# Patient Record
Sex: FEMALE | Race: BLACK OR AFRICAN AMERICAN | NOT HISPANIC OR LATINO | Employment: FULL TIME | URBAN - METROPOLITAN AREA
[De-identification: names, ages, dates, MRNs, and addresses within clinical notes are randomized per-mention and may not be internally consistent; named-entity substitution may affect disease eponyms.]

---

## 2017-01-15 ENCOUNTER — HOSPITAL ENCOUNTER (EMERGENCY)
Facility: HOSPITAL | Age: 27
Discharge: HOME | End: 2017-01-15
Attending: FAMILY MEDICINE
Payer: MEDICAID

## 2017-01-15 VITALS
HEART RATE: 90 BPM | RESPIRATION RATE: 16 BRPM | SYSTOLIC BLOOD PRESSURE: 133 MMHG | WEIGHT: 210 LBS | TEMPERATURE: 99 F | OXYGEN SATURATION: 100 % | DIASTOLIC BLOOD PRESSURE: 63 MMHG | BODY MASS INDEX: 34.95 KG/M2

## 2017-01-15 DIAGNOSIS — J02.9 SORE THROAT: Primary | ICD-10-CM

## 2017-01-15 DIAGNOSIS — J06.9 URI, ACUTE: ICD-10-CM

## 2017-01-15 LAB
B-HCG UR QL: NEGATIVE
CTP QC/QA: YES
DEPRECATED S PYO AG THROAT QL EIA: NEGATIVE
FLUAV AG SPEC QL IA: NEGATIVE
FLUBV AG SPEC QL IA: NEGATIVE
SPECIMEN SOURCE: NORMAL

## 2017-01-15 PROCEDURE — 25000003 PHARM REV CODE 250: Performed by: PHYSICIAN ASSISTANT

## 2017-01-15 PROCEDURE — 87400 INFLUENZA A/B EACH AG IA: CPT

## 2017-01-15 PROCEDURE — 99283 EMERGENCY DEPT VISIT LOW MDM: CPT

## 2017-01-15 PROCEDURE — 87081 CULTURE SCREEN ONLY: CPT

## 2017-01-15 PROCEDURE — 87880 STREP A ASSAY W/OPTIC: CPT

## 2017-01-15 PROCEDURE — 81025 URINE PREGNANCY TEST: CPT | Performed by: PHYSICIAN ASSISTANT

## 2017-01-15 PROCEDURE — 99284 EMERGENCY DEPT VISIT MOD MDM: CPT | Mod: ,,, | Performed by: PHYSICIAN ASSISTANT

## 2017-01-15 RX ORDER — ACETAMINOPHEN 325 MG/1
650 TABLET ORAL
Status: COMPLETED | OUTPATIENT
Start: 2017-01-15 | End: 2017-01-15

## 2017-01-15 RX ORDER — BENZONATATE 100 MG/1
100 CAPSULE ORAL 3 TIMES DAILY PRN
Qty: 15 CAPSULE | Refills: 0 | Status: SHIPPED | OUTPATIENT
Start: 2017-01-15 | End: 2017-01-25

## 2017-01-15 RX ADMIN — ACETAMINOPHEN 650 MG: 325 TABLET ORAL at 05:01

## 2017-01-15 NOTE — ED AVS SNAPSHOT
[facility]  [address]  [place] [address]               Leydi Ferrell   1/15/2017  5:13 PM   ED    Description:  Female : 1990   Department[facility]  [facility]           Your Care was Coordinated By:     Provider Role From To    Poncho Davies MD Attending Provider 01/15/17 1716 --    Lisa Mane PA-C Physician Assistant 01/15/17 1716 --      Reason for Visit     Sore Throat     Otalgia           Diagnoses this Visit        Comments    Sore throat    -  Primary     URI, acute           ED Disposition     ED Disposition Condition Comment    Discharge             To Do List           Follow-up Information     Schedule an appointment as soon as possible for a visit with DAUGHTERS DAVID RICE.    Contact information:    [address] [id number] [phone]         These Medications        Disp Refills Start End    benzonatate (TESSALON) 100 MG capsule 15 capsule 0 1/15/2017 2017    Take 1 capsule (100 mg total) by mouth 3 (three) times daily as needed. - Oral      [facility]     [facility] -  Assistance  Registered nurses in the [facility] provide clinical advisement, health education, appointment booking, and other advisory services.  Call for this free service at [phone].             Medications           Message regarding Medications     Verify the changes and/or additions to your medication regime listed below are the same as discussed with your clinician today.  If any of these changes or additions are incorrect, please notify your healthcare provider.        START taking these NEW medications        Refills    benzonatate (TESSALON) 100 MG capsule 0    Sig: Take 1 capsule (100 mg total) by mouth 3 (three) times daily as needed.    Class: Print    Route: Oral      These medications were administered today        Dose Freq    acetaminophen tablet 650 mg  650 mg ED 1 Time    Sig: Take 2 tablets (650 mg total) by mouth ED 1 Time.    Class: Normal    Route: Oral    Cosign for Ordering: Accepted by Poncho Davies MD on 1/15/2017  6:22 PM      STOP taking these medications     acyclovir (ZOVIRAX) 800 MG Tab Take 1 tablet (800 mg total) by mouth 5 (five) times daily.    docusate sodium (COLACE) 100 MG capsule Take 1 capsule (100 mg total) by mouth 2 (two) times daily.    hydrocortisone 1 % cream Apply to affected area 2 times daily    oxycodone-acetaminophen (PERCOCET) 5-325 mg per tablet Take 1 tablet by mouth every 4 (four) hours as needed for Pain.           Verify that the below list of medications is an accurate representation of the medications you are currently taking.  If none reported, the list may be blank. If incorrect, please contact your healthcare provider. Carry this list with you in case of emergency.           Current Medications     benzonatate (TESSALON) 100 MG capsule Take 1 capsule (100 mg total) by mouth 3 (three) times daily as needed.           Clinical Reference Information           Your Vitals Were     BP Pulse Temp Resp Weight SpO2    124/66 (BP Location: Right arm, Patient Position: Sitting) 90 98.7 °F (37.1 °C) (Oral) 16 95.3 kg (210 lb) 98%    BMI                34.95 kg/m2          Allergies as of 1/15/2017     No Known Allergies      Immunizations Administered on Date of Encounter - 1/15/2017     None      ED Micro, Lab, POCT     Start Ordered       Status Ordering Provider    01/15/17 1749 01/15/17 1748  Influenza antigen  STAT      Final result     01/15/17 1749 01/15/17 1748  Rapid strep screen  STAT      Final result     01/15/17 1748 01/15/17 1748  Strep A culture, throat  Once      In process     01/15/17 1717 01/15/17 1716  POCT urine pregnancy  Once      Final result       ED Imaging Orders     None        Discharge Instructions       Follow up with your PCP. Take the cough medication (tessalon) three times a day as needed. Use  OTC flonase and mucinex. Use OTC theraflu. Take OTC tylenol and/or advil as needed for pain.  Return to the ED for any new or worsening symptoms, including those listed below.        Self-Care for Sore Throats  Sore throats occur for many reasons, such as colds, allergies, and infections caused by viruses or bacteria. In any case, your throat becomes red and sore. Your goal for self-care is to reduce your discomfort while giving your throat a chance to heal.    Moisten and Soothe Your Throat  · Try a sip of water first thing after waking up.  · Keep your throat moist by drinking 6 or more glasses of clear liquids every day.  · Run a cool-air humidifier in your room overnight.  · Avoid cigarette smoke.   · Suck on throat lozenges, cough drops, hard candy, ice chips, or frozen fruit-juice bars. Use the sugar-free versions if your diet or medical condition require them.  Gargle to Ease Irritation  Gargling every hour or 2 can ease irritation. Try gargling with 1 of these solutions:  · 1/4 teaspoon of salt in 1/2 cup of warm water  · An over-the-counter anesthetic gargle  Use Medication for More Relief  Over-the-counter medication can reduce sore throat symptoms. Ask your pharmacist if you have questions about which medication to use:  · Ease pain with anesthetic sprays. Aspirin or an aspirin substitute also helps. Remember, never give aspirin to anyone 18 or younger, or if you are already taking blood thinners.   · For sore throats caused by allergies, try antihistamines to block the allergic reaction.  · Remember: unless a sore throat is caused by a bacterial infection, antibiotics wont help you.  Prevent Future Sore Throats  · Stop smoking or reduce contact with secondhand smoke. Smoke irritates the tender throat lining.  · Limit contact with pets and with allergy-causing substances, such as pollen and mold.  · When youre around someone with a sore throat or cold, wash your hands frequently to keep viruses or  bacteria from spreading.  · Dont strain your vocal cords.  Call Your Health Care Provider  Contact your doctor if you have:  · A temperature over 101°F (38.3°C)  · White spots on the throat  · Great difficulty swallowing  · Trouble breathing  · A skin rash  · Recent exposure to someone else with strep bacteria  · Severe hoarseness and swollen glands in the neck or jaw   © 7674-0036 [employer]. [address] [place] [address]. All rights reserved. This information is not intended as a substitute for professional medical care. Always follow your healthcare professional's instructions.        Viral Upper Respiratory Illness (Adult)  You have a viral upper respiratory illness (URI), which is another term for the common cold. This illness is contagious during the first few days. It is spread through the air by coughing and sneezing. It may also be spread by direct contact (touching the sick person and then touching your own eyes, nose, or mouth). Frequent handwashing will decrease risk of spread. Most viral illnesses go away within 7 to 10 days with rest and simple home remedies. Sometimes the illness may last for several weeks. Antibiotics will not kill a virus, and they are generally not prescribed for this condition.    Home care  · If symptoms are severe, rest at home for the first 2 to 3 days. When you resume activity, don't let yourself get too tired.  · Avoid being exposed to cigarette smoke (yours or others).  · You may use acetaminophen or ibuprofen to control pain and fever, unless another medicine was prescribed. (Note: If you have chronic liver or kidney disease, have ever had a stomach ulcer or gastrointestinal bleeding, or are taking blood-thinning medicines, talk with your healthcare provider before using these medicines.) Aspirin should never be given to anyone under 18 years of age who is ill with a viral infection or fever. It may cause severe liver or brain damage.  · Your  appetite may be poor, so a light diet is fine. Avoid dehydration by drinking 6 to 8 glasses of fluids per day (water, soft drinks, juices, tea, or soup). Extra fluids will help loosen secretions in the nose and lungs.  · Over-the-counter cold medicines will not shorten the length of time youre sick, but they may be helpful for the following symptoms: cough, sore throat, and nasal and sinus congestion. (Note: Do not use decongestants if you have high blood pressure.)  Follow-up care  Follow up with your healthcare provider, or as advised.  When to seek medical advice  Call your healthcare provider right away if any of these occur:  · Cough with lots of colored sputum (mucus)  · Severe headache; face, neck, or ear pain  · Difficulty swallowing due to throat pain  · Fever of 100.4°F (38°C)  Call 911, or get immediate medical care  Call emergency services right away if any of these occur:  · Chest pain, shortness of breath, wheezing, or difficulty breathing  · Coughing up blood  · Inability to swallow due to throat pain  © 7863-5062 [employer]. [address] [place] [address]. All rights reserved. This information is not intended as a substitute for professional medical care. Always follow your healthcare professional's instructions.            [facility] Sign-Up     Activating your [facility] account is as easy as 1-2-3!     1) Visit [email or web address], select Sign Up Now, enter this activation code and your date of birth, then select Next.  [email or web address]  Expires: 3/1/2017  7:38 PM      2) Create a username and password to use when you visit [facility] in the future and select a security question in case you lose your password and select Next.    3) Enter your e-mail address and click Sign Up!    Additional Information  If you have questions, please e-mail [email or web address] or call [phone] to talk to our [facility] staff. Remember, [facility] is NOT to be used for urgent needs. For  medical emergencies, dial 911.          [facility] complies with applicable Federal civil rights laws and does not discriminate on the basis of race, color, national origin, age, disability, or sex.        Language Assistance Services     ATTENTION: Language assistance services are available, free of charge. Please call [phone].      ATENCIÓN: Si habla español, tiene a joseph disposición servicios gratuitos de asistencia lingüística. Llame al [phone].     CHÚ Ý: N?u b?n nói Ti?ng Vi?t, có các d?ch v? h? tr? ngôn ng? mi?n phí dành cho b?n. G?i s? [phone].

## 2017-01-15 NOTE — ED PROVIDER NOTES
Encounter Date: 1/15/2017       History     Chief Complaint   Patient presents with    Sore Throat    Otalgia     Review of patient's allergies indicates:  No Known Allergies  HPI Comments: 26-year-old -American female with past medical history of depression presents to the ED complaining of URI symptoms for the past 3 days.  She reports a sore throat, bilateral ear pain, postnasal drip, rhinorrhea, dry cough, chills, nausea and one episode of emesis.  She has not had a flu vaccine this year.  She denies any sick contacts.  She is not currently on any antibiotics.  LMP 5 years ago - patient has IUD.  She denies fever, chills, chest pain, shortness of breath, abdominal pain, headache, dizziness, lightheadedness, diarrhea, constipation.  She occasionally drinks alcohol and denies tobacco or drug use.    The history is provided by the patient.     Past Medical History   Diagnosis Date    Depression      Past Medical History Pertinent Negatives   Diagnosis Date Noted    ADHD (attention deficit hyperactivity disorder) 2/24/2014    Anxiety 2/24/2014    Bipolar disorder 2/24/2014    Borderline personality disorder 2/24/2014    CHF (congestive heart failure) 2/24/2014    COPD (chronic obstructive pulmonary disease) 2/24/2014    CVA (cerebral vascular accident) 2/24/2014    Dementia 2/24/2014    Dialysis patient 2/24/2014    Fatigue 2/24/2014    Headache(784.0) 2/24/2014    History of psychiatric hospitalization 2/24/2014    HIV infection 2/24/2014    Hypertension 2/24/2014    Liver disease 2/24/2014    Audra 2/24/2014    Neuropathy 2/24/2014    Obsessive-compulsive disorder 2/24/2014    Oppositional defiant disorder 2/24/2014    Psychiatric exam requested by authority 2/24/2014    Psychiatric problem 2/24/2014    Psychosis 2/24/2014    PTSD (post-traumatic stress disorder) 2/24/2014    Renal disorder 2/24/2014    Schizoaffective disorder 2/24/2014    Seizures 2/24/2014    Self-harming  behavior 2/24/2014    Suicide attempt 2/24/2014    Therapy 2/24/2014    Thyroid disease 2/24/2014     No past surgical history on file.  Family History   Problem Relation Age of Onset    Diabetes Maternal Grandmother      Social History   Substance Use Topics    Smoking status: Never Smoker    Smokeless tobacco: Never Used    Alcohol use Yes      Comment: on special occassions     Review of Systems   Constitutional: Positive for chills. Negative for fever.   HENT: Positive for congestion, ear pain (L>R), postnasal drip, rhinorrhea and sore throat.    Eyes: Negative for photophobia and visual disturbance.   Respiratory: Positive for cough. Negative for shortness of breath and wheezing.    Cardiovascular: Negative for chest pain and palpitations.   Gastrointestinal: Positive for nausea and vomiting. Negative for abdominal pain, constipation and diarrhea.   Genitourinary: Negative for dysuria, hematuria, vaginal bleeding and vaginal discharge.   Musculoskeletal: Negative for back pain, neck pain and neck stiffness.   Skin: Negative for rash and wound.   Neurological: Negative for dizziness, syncope, weakness, light-headedness, numbness and headaches.   Psychiatric/Behavioral: Negative for confusion.       Physical Exam   Initial Vitals   BP Pulse Resp Temp SpO2   01/15/17 1536 01/15/17 1536 01/15/17 1536 01/15/17 1536 01/15/17 1536   124/66 90 16 98.7 °F (37.1 °C) 98 %     Physical Exam    Nursing note and vitals reviewed.  Constitutional: She appears well-developed and well-nourished. She is not diaphoretic. No distress.   HENT:   Right Ear: Tympanic membrane, external ear and ear canal normal.   Left Ear: Tympanic membrane, external ear and ear canal normal.   Nose: Mucosal edema present. Right sinus exhibits no maxillary sinus tenderness and no frontal sinus tenderness. Left sinus exhibits no maxillary sinus tenderness and no frontal sinus tenderness.   Mouth/Throat: Uvula is midline and mucous membranes are  normal. Posterior oropharyngeal edema and posterior oropharyngeal erythema present. No oropharyngeal exudate or tonsillar abscesses.   Neck: Normal range of motion. Neck supple.   Cardiovascular: Normal rate, regular rhythm and normal heart sounds. Exam reveals no gallop and no friction rub.    No murmur heard.  Pulmonary/Chest: Breath sounds normal. She has no wheezes. She has no rhonchi. She has no rales.   Abdominal: Soft. Bowel sounds are normal. There is no tenderness. There is no guarding.   Musculoskeletal: Normal range of motion.   Lymphadenopathy:     She has cervical adenopathy.   Neurological: She is alert and oriented to person, place, and time.   Skin: Skin is warm and dry. No rash noted. No erythema.   Psychiatric: She has a normal mood and affect.         ED Course   Procedures  Labs Reviewed   THROAT SCREEN, RAPID   CULTURE, STREP A,  THROAT   INFLUENZA A AND B ANTIGEN   POCT URINE PREGNANCY             Medical Decision Making:   History:   Old Medical Records: I decided to obtain old medical records.  Clinical Tests:   Lab Tests: Ordered and Reviewed       APC / Resident Notes:   26-year-old -American female with past medical history depression presents to the ED complaining of URI symptoms for the past 3 days.  Vital signs stable.  Regular rate and rhythm without murmurs.  Lungs are clear to auscultation bilaterally without wheezes.  Abdomen is soft and nontender to palpation.  No signs of acute otitis media.  Posterior oropharyngeal erythema and edema without any exudates.  No sinus tenderness to palpation.  Tender anterior cervical lymph nodes.  Differential diagnosis includes but isn't limited to viral URI, influenza, strep pharyngitis.  Will get rapid flu and strep, give Tylenol reassess.    UPT negative.  Rapid strep negative.  Influenza negative.    I do not feel the patient needs any further labs or imaging at this time.  Stable for discharge.  Will treat for viral URI.    She was  discharged with a prescription for Tessalon.  She was instructed on over-the-counter management for viral URI.  She will follow up with her PCP.  All of the patient's questions were answered.  I reviewed the patient's chart and labs and discussed the case with my supervising physician.                 ED Course     Clinical Impression:   The primary encounter diagnosis was Sore throat. A diagnosis of URI, acute was also pertinent to this visit.    Disposition:   Disposition: Discharged  Condition: Stable       Lisa Mane PA-C  01/15/17 2306

## 2017-01-16 NOTE — DISCHARGE INSTRUCTIONS
Follow up with your PCP. Take the cough medication (tessalon) three times a day as needed. Use OTC flonase and mucinex. Use OTC theraflu. Take OTC tylenol and/or advil as needed for pain.  Return to the ED for any new or worsening symptoms, including those listed below.        Self-Care for Sore Throats  Sore throats occur for many reasons, such as colds, allergies, and infections caused by viruses or bacteria. In any case, your throat becomes red and sore. Your goal for self-care is to reduce your discomfort while giving your throat a chance to heal.    Moisten and Soothe Your Throat  · Try a sip of water first thing after waking up.  · Keep your throat moist by drinking 6 or more glasses of clear liquids every day.  · Run a cool-air humidifier in your room overnight.  · Avoid cigarette smoke.   · Suck on throat lozenges, cough drops, hard candy, ice chips, or frozen fruit-juice bars. Use the sugar-free versions if your diet or medical condition require them.  Gargle to Ease Irritation  Gargling every hour or 2 can ease irritation. Try gargling with 1 of these solutions:  · 1/4 teaspoon of salt in 1/2 cup of warm water  · An over-the-counter anesthetic gargle  Use Medication for More Relief  Over-the-counter medication can reduce sore throat symptoms. Ask your pharmacist if you have questions about which medication to use:  · Ease pain with anesthetic sprays. Aspirin or an aspirin substitute also helps. Remember, never give aspirin to anyone 18 or younger, or if you are already taking blood thinners.   · For sore throats caused by allergies, try antihistamines to block the allergic reaction.  · Remember: unless a sore throat is caused by a bacterial infection, antibiotics wont help you.  Prevent Future Sore Throats  · Stop smoking or reduce contact with secondhand smoke. Smoke irritates the tender throat lining.  · Limit contact with pets and with allergy-causing substances, such as pollen and mold.  · When youre  around someone with a sore throat or cold, wash your hands frequently to keep viruses or bacteria from spreading.  · Dont strain your vocal cords.  Call Your Health Care Provider  Contact your doctor if you have:  · A temperature over 101°F (38.3°C)  · White spots on the throat  · Great difficulty swallowing  · Trouble breathing  · A skin rash  · Recent exposure to someone else with strep bacteria  · Severe hoarseness and swollen glands in the neck or jaw   © 2000-2016 [employer]. [address] [place] [address]. All rights reserved. This information is not intended as a substitute for professional medical care. Always follow your healthcare professional's instructions.        Viral Upper Respiratory Illness (Adult)  You have a viral upper respiratory illness (URI), which is another term for the common cold. This illness is contagious during the first few days. It is spread through the air by coughing and sneezing. It may also be spread by direct contact (touching the sick person and then touching your own eyes, nose, or mouth). Frequent handwashing will decrease risk of spread. Most viral illnesses go away within 7 to 10 days with rest and simple home remedies. Sometimes the illness may last for several weeks. Antibiotics will not kill a virus, and they are generally not prescribed for this condition.    Home care  · If symptoms are severe, rest at home for the first 2 to 3 days. When you resume activity, don't let yourself get too tired.  · Avoid being exposed to cigarette smoke (yours or others).  · You may use acetaminophen or ibuprofen to control pain and fever, unless another medicine was prescribed. (Note: If you have chronic liver or kidney disease, have ever had a stomach ulcer or gastrointestinal bleeding, or are taking blood-thinning medicines, talk with your healthcare provider before using these medicines.) Aspirin should never be given to anyone under 18 years of age who is  ill with a viral infection or fever. It may cause severe liver or brain damage.  · Your appetite may be poor, so a light diet is fine. Avoid dehydration by drinking 6 to 8 glasses of fluids per day (water, soft drinks, juices, tea, or soup). Extra fluids will help loosen secretions in the nose and lungs.  · Over-the-counter cold medicines will not shorten the length of time youre sick, but they may be helpful for the following symptoms: cough, sore throat, and nasal and sinus congestion. (Note: Do not use decongestants if you have high blood pressure.)  Follow-up care  Follow up with your healthcare provider, or as advised.  When to seek medical advice  Call your healthcare provider right away if any of these occur:  · Cough with lots of colored sputum (mucus)  · Severe headache; face, neck, or ear pain  · Difficulty swallowing due to throat pain  · Fever of 100.4°F (38°C)  Call 911, or get immediate medical care  Call emergency services right away if any of these occur:  · Chest pain, shortness of breath, wheezing, or difficulty breathing  · Coughing up blood  · Inability to swallow due to throat pain  © 7884-8021 [employer]. [address] [place] [address], [place] [address]. All rights reserved. This information is not intended as a substitute for professional medical care. Always follow your healthcare professional's instructions.

## 2017-01-17 LAB — BACTERIA THROAT CULT: NORMAL

## 2017-04-04 ENCOUNTER — HOSPITAL ENCOUNTER (EMERGENCY)
Facility: HOSPITAL | Age: 27
Discharge: HOME | End: 2017-04-04
Attending: EMERGENCY MEDICINE
Payer: MEDICAID

## 2017-04-04 VITALS
SYSTOLIC BLOOD PRESSURE: 124 MMHG | DIASTOLIC BLOOD PRESSURE: 56 MMHG | HEART RATE: 83 BPM | HEIGHT: 65 IN | TEMPERATURE: 98 F | BODY MASS INDEX: 34.16 KG/M2 | WEIGHT: 205 LBS | OXYGEN SATURATION: 97 % | RESPIRATION RATE: 16 BRPM

## 2017-04-04 DIAGNOSIS — R30.0 DYSURIA: Primary | ICD-10-CM

## 2017-04-04 LAB
B-HCG UR QL: NEGATIVE
BACTERIA #/AREA URNS HPF: ABNORMAL /HPF
BILIRUB UR QL STRIP: NEGATIVE
CLARITY UR: ABNORMAL
COLOR UR: YELLOW
CTP QC/QA: YES
GLUCOSE UR QL STRIP: NEGATIVE
HGB UR QL STRIP: ABNORMAL
HYALINE CASTS #/AREA URNS LPF: 0 /LPF
KETONES UR QL STRIP: ABNORMAL
LEUKOCYTE ESTERASE UR QL STRIP: ABNORMAL
MICROSCOPIC COMMENT: ABNORMAL
NITRITE UR QL STRIP: NEGATIVE
PH UR STRIP: 6 [PH] (ref 5–8)
PROT UR QL STRIP: ABNORMAL
RBC #/AREA URNS HPF: 68 /HPF (ref 0–4)
SP GR UR STRIP: >=1.03 (ref 1–1.03)
URN SPEC COLLECT METH UR: ABNORMAL
UROBILINOGEN UR STRIP-ACNC: 1 EU/DL
WBC #/AREA URNS HPF: 1 /HPF (ref 0–5)

## 2017-04-04 PROCEDURE — 99283 EMERGENCY DEPT VISIT LOW MDM: CPT

## 2017-04-04 PROCEDURE — 81025 URINE PREGNANCY TEST: CPT | Performed by: EMERGENCY MEDICINE

## 2017-04-04 PROCEDURE — 87086 URINE CULTURE/COLONY COUNT: CPT

## 2017-04-04 PROCEDURE — 81000 URINALYSIS NONAUTO W/SCOPE: CPT

## 2017-04-04 RX ORDER — NITROFURANTOIN 25; 75 MG/1; MG/1
100 CAPSULE ORAL 2 TIMES DAILY
Qty: 10 CAPSULE | Refills: 0 | Status: SHIPPED | OUTPATIENT
Start: 2017-04-04 | End: 2017-04-11

## 2017-04-04 NOTE — ED AVS SNAPSHOT
[facility]  [address]  [place] [address]               Leydi Ferrell   2017 12:58 AM   ED    Description:  Female : 1990   Department:  [facility]           Your Care was Coordinated By:     Provider Role From To    Alicia Roberts MD Attending Provider 17 0113 --      Reason for Visit     Dysuria           Diagnoses this Visit        Comments    Dysuria    -  Primary       ED Disposition     None           To Do List           Follow-up Information     Follow up with Primary Doctor No In 2 days.       These Medications        Disp Refills Start End    nitrofurantoin, macrocrystal-monohydrate, (MACROBID) 100 MG capsule 10 capsule 0 2017    Take 1 capsule (100 mg total) by mouth 2 (two) times daily. - Oral      [facility]     [facility] -  Assistance  Unless otherwise directed by your provider, please contact [facility], our nurse care line that is available for  assistance.     Registered nurses in the [facility] provide: appointment scheduling, clinical advisement, health education, and other advisory services.  Call: [phone] (toll free)               Medications           Message regarding Medications     Verify the changes and/or additions to your medication regime listed below are the same as discussed with your clinician today.  If any of these changes or additions are incorrect, please notify your healthcare provider.        START taking these NEW medications        Refills    nitrofurantoin, macrocrystal-monohydrate, (MACROBID) 100 MG capsule 0    Sig: Take 1 capsule (100 mg total) by mouth 2 (two) times daily.    Class: Print    Route: Oral           Verify that the below list of medications is an accurate representation of the medications you are currently taking.  If none reported, the list may be blank. If incorrect, please contact your  "healthcare provider. Carry this list with you in case of emergency.           Current Medications     nitrofurantoin, macrocrystal-monohydrate, (MACROBID) 100 MG capsule Take 1 capsule (100 mg total) by mouth 2 (two) times daily.           Clinical Reference Information           Your Vitals Were     BP Pulse Temp Resp Height Weight    124/56 (BP Location: Right arm, Patient Position: Sitting) 83 98.2 °F (36.8 °C) (Oral) 16 5' 5" (1.651 m) 93 kg (205 lb)    SpO2 BMI             97% 34.11 kg/m2         Allergies as of 4/4/2017     No Known Allergies      Immunizations Administered on Date of Encounter - 4/4/2017     None      ED Micro, Lab, POCT     Start Ordered       Status Ordering Provider    04/04/17 0209 04/04/17 0208  Urine culture **CANNOT BE ORDERED STAT**  Once      Ordered     04/04/17 0115 04/04/17 0115    Once,   Status:  Canceled      Canceled     04/04/17 0059 04/04/17 0058  POCT urine pregnancy  Once      Final result     04/04/17 0058 04/04/17 0058  Urinalysis Clean Catch  STAT      Final result     04/04/17 0058 04/04/17 0058  Urinalysis Microscopic  Once      Final result       ED Imaging Orders     None        Discharge Instructions         Dysuria     Painful urination (dysuria) is often caused by a problem in the urinary tract.   Dysuria is pain felt during urination. It is often described as a burning. Learn more about this problem and how it can be treated.  What causes dysuria?  Possible causes include:  · Infection with a bacteria or virus. This can be a urinary tract infection (UTI). Or it may be a sexually transmitted infection (STI).  · Sensitivity or allergy to chemicals. These chemicals are found in lotions and other products.  · Prostate or bladder problems  · Radiation therapy to the pelvic area  How is dysuria diagnosed?  Your healthcare provider will examine you. He or she will ask about your symptoms and health. After talking with you and doing a physical exam, your healthcare " "provider may know what is causing your dysuria. He or she will usually request  a sample of your urine. Tests of your urine, or a "urinalysis," are done. A urinalysis may include:  · Looking at the urine sample (visual exam)  · Checking for substances (chemical exam)  · Checking a small amount under a microscope (microscopic exam)  Some parts of the urinalysis may be done in the provider's office and some in a lab. And, the urine sample may be checked for bacteria and yeast (urine culture). Your healthcare provider will tell you more about these tests if they are needed.  How is dysuria treated?  Treatment depends on the cause. If you have a bacterial infection, you may need antibiotics. You may be given medications to make it easier for you to urinate and help relieve pain. Your healthcare provider can tell you more about your treatment options. Untreated, symptoms may get worse.  Call the healthcare provider right away if you have any of the following:  · Fever of 100.4°F (38°C) or higher   · No improvement after three days of treatment  · Trouble urinating because of pain  · New or increased discharge from the vagina or penis  · Rash or joint pain  · Increased back or abdominal pain  · Enlarged painful lymph nodes (lumps) in the groin   Date Last Reviewed: 9/24/2014 © 2000-2016 "[employer]. [address] [place] [address], [place] [address]. All rights reserved. This information is not intended as a substitute for professional medical care. Always follow your healthcare professional's instructions.          [facility] Sign-Up     Activating your [facility] account is as easy as 1-2-3!     1) Visit [email or web address], select Sign Up Now, enter this activation code and your date of birth, then select Next.  [email or web address]  Expires: 5/19/2017  2:08 AM      2) Create a username and password to use when you visit [facility] in the future and select a security question in case you lose your password and select " Next.    3) Enter your e-mail address and click Sign Up!    Additional Information  If you have questions, please e-mail [email or web address] or call [phone] to talk to our [facility] staff. Remember, [facility] is NOT to be used for urgent needs. For medical emergencies, dial 911.          [facility] complies with applicable Federal civil rights laws and does not discriminate on the basis of race, color, national origin, age, disability, or sex.        Language Assistance Services     ATTENTION: Language assistance services are available, free of charge. Please call [phone].      ATENCIÓN: Si habla español, tiene a joseph disposición servicios gratuitos de asistencia lingüística. Llame al [phone].     CHÚ Ý: N?u b?n nói Ti?ng Vi?t, có các d?ch v? h? tr? ngôn ng? mi?n phí dành cho b?n. G?i s? [phone].

## 2017-04-04 NOTE — DISCHARGE INSTRUCTIONS
"  Dysuria     Painful urination (dysuria) is often caused by a problem in the urinary tract.   Dysuria is pain felt during urination. It is often described as a burning. Learn more about this problem and how it can be treated.  What causes dysuria?  Possible causes include:  · Infection with a bacteria or virus. This can be a urinary tract infection (UTI). Or it may be a sexually transmitted infection (STI).  · Sensitivity or allergy to chemicals. These chemicals are found in lotions and other products.  · Prostate or bladder problems  · Radiation therapy to the pelvic area  How is dysuria diagnosed?  Your healthcare provider will examine you. He or she will ask about your symptoms and health. After talking with you and doing a physical exam, your healthcare provider may know what is causing your dysuria. He or she will usually request  a sample of your urine. Tests of your urine, or a "urinalysis," are done. A urinalysis may include:  · Looking at the urine sample (visual exam)  · Checking for substances (chemical exam)  · Checking a small amount under a microscope (microscopic exam)  Some parts of the urinalysis may be done in the provider's office and some in a lab. And, the urine sample may be checked for bacteria and yeast (urine culture). Your healthcare provider will tell you more about these tests if they are needed.  How is dysuria treated?  Treatment depends on the cause. If you have a bacterial infection, you may need antibiotics. You may be given medications to make it easier for you to urinate and help relieve pain. Your healthcare provider can tell you more about your treatment options. Untreated, symptoms may get worse.  Call the healthcare provider right away if you have any of the following:  · Fever of 100.4°F (38°C) or higher   · No improvement after three days of treatment  · Trouble urinating because of pain  · New or increased discharge from the vagina or penis  · Rash or joint " pain  · Increased back or abdominal pain  · Enlarged painful lymph nodes (lumps) in the groin   Date Last Reviewed: 9/24/2014  © 4636-7546 [employer]. [address] [place] [address], [place] [address]. All rights reserved. This information is not intended as a substitute for professional medical care. Always follow your healthcare professional's instructions.

## 2017-04-04 NOTE — ED PROVIDER NOTES
Encounter Date: 4/4/2017       History     Chief Complaint   Patient presents with    Dysuria     dysuria, frequency, lower abdominal cramping since yesterday.      Review of patient's allergies indicates:  No Known Allergies  Patient is a 27 y.o. female presenting with the following complaint: female genitourinary complaint.   Female  Problem   Primary symptoms include dysuria.    Primary symptoms include no discharge, no fever, no genital itching, no vaginal bleeding.   This is a new problem. The current episode started yesterday. The problem occurs intermittently. The symptoms occur during urination. She is not pregnant. Associated symptoms include frequency. Pertinent negatives include no abdominal pain, no constipation, no diarrhea, no nausea and no light-headedness.     Past Medical History:   Diagnosis Date    Depression     Genital herpes      No past surgical history on file.  Family History   Problem Relation Age of Onset    Diabetes Maternal Grandmother      Social History   Substance Use Topics    Smoking status: Never Smoker    Smokeless tobacco: Never Used    Alcohol use Yes      Comment: on special occassions     Review of Systems   Constitutional: Negative for appetite change, chills, fatigue and fever.   HENT: Negative for nosebleeds, rhinorrhea, sneezing and sore throat.    Eyes: Negative for photophobia, pain and visual disturbance.   Respiratory: Negative for cough, chest tightness and shortness of breath.    Cardiovascular: Negative for chest pain, palpitations and leg swelling.   Gastrointestinal: Negative for abdominal pain, constipation, diarrhea and nausea.   Genitourinary: Positive for dysuria and frequency. Negative for urgency and vaginal bleeding.   Musculoskeletal: Negative for back pain, gait problem, neck pain and neck stiffness.   Skin: Negative for color change and rash.   Neurological: Negative for weakness, light-headedness, numbness and headaches.   Hematological:  Negative for adenopathy. Does not bruise/bleed easily.   Psychiatric/Behavioral: Negative for confusion, decreased concentration and suicidal ideas.       Physical Exam   Initial Vitals   BP Pulse Resp Temp SpO2   04/04/17 0056 04/04/17 0056 04/04/17 0056 04/04/17 0056 04/04/17 0056   124/56 83 16 98.2 °F (36.8 °C) 97 %     Physical Exam    Nursing note and vitals reviewed.  Constitutional: Vital signs are normal. She appears well-developed.  Non-toxic appearance.   HENT:   Head: Normocephalic and atraumatic.   Eyes: Conjunctivae and lids are normal.   Neck: Trachea normal and normal range of motion. Neck supple.   Cardiovascular: Normal rate, regular rhythm, normal heart sounds, intact distal pulses and normal pulses.   Lungs clear to auscultation B, no wheezes. Good air movement   Pulmonary/Chest: Breath sounds normal.   Abdominal: Soft. Normal appearance and bowel sounds are normal. There is tenderness in the suprapubic area. There is no guarding and no CVA tenderness.       Musculoskeletal: Normal range of motion.   Lymphadenopathy:     She has no cervical adenopathy.   Neurological: She is alert. She has normal strength and normal reflexes. No cranial nerve deficit or sensory deficit. She displays a negative Romberg sign. Coordination and gait normal. GCS eye subscore is 4. GCS verbal subscore is 5. GCS motor subscore is 6.   Skin: Skin is warm and dry.   Psychiatric: She has a normal mood and affect. Her speech is normal and behavior is normal.         ED Course   Procedures  Labs Reviewed   URINALYSIS   URINALYSIS MICROSCOPIC   POCT URINE PREGNANCY                Additional MDM:   Comments: 27-year-old female with dysuria and frequency for the past 2 days.  No nausea no back pain.  Out suprapubic tenderness on exam.  She is on the Mirena and frequently spots.  Patient says she has not noted any vaginal bleeding today but 2 days ago she did have some vaginal spotting.  She has never had a kidney stone.   Patient looks quite comfortable.  I will treat her as a UTI culture her urine and have her follow-up with her PCP in 2 days to check the cultures and ensure that she is doing better..                 ED Course     Clinical Impression:   The encounter diagnosis was Dysuria.          Alicia Roberts MD  04/04/17 0209

## 2017-04-04 NOTE — ED NOTES
Patient identifiers verified and correct for Leydi Ferrell.    LOC: The patient is awake, alert and aware of environment with an appropriate affect, the patient is oriented x 3 and speaking appropriately.  APPEARANCE: Patient resting comfortably and in no acute distress, patient is clean and well groomed, patient's clothing is properly fastened.  SKIN: The skin is warm and dry, color consistent with ethnicity, patient has normal skin turgor and moist mucus membranes, skin intact, no breakdown or bruising noted.  MUSCULOSKELETAL: Patient moving all extremities spontaneously, no obvious swelling or deformities noted.  RESPIRATORY: Airway is open and patent, respirations are spontaneous, patient has a normal effort and rate, no accessory muscle use noted.  ABDOMEN: Soft and non tender to palpation, no distention noted, normoactive bowel sounds present in all four quadrants. Intermittent RLQ cramping with dysuria and frquency.

## 2017-04-04 NOTE — ED TRIAGE NOTES
C/o intermittent abdominal cramping with dysuria for past several days. No fever. Presents in no distress. Denies vaginal discharge.

## 2017-04-05 LAB
BACTERIA UR CULT: NORMAL
BACTERIA UR CULT: NORMAL

## 2017-10-10 ENCOUNTER — HOSPITAL ENCOUNTER (EMERGENCY)
Facility: HOSPITAL | Age: 27
Discharge: HOME | End: 2017-10-10
Attending: EMERGENCY MEDICINE
Payer: MEDICAID

## 2017-10-10 VITALS
DIASTOLIC BLOOD PRESSURE: 66 MMHG | HEIGHT: 64 IN | TEMPERATURE: 98 F | BODY MASS INDEX: 35.85 KG/M2 | WEIGHT: 210 LBS | OXYGEN SATURATION: 99 % | SYSTOLIC BLOOD PRESSURE: 123 MMHG | RESPIRATION RATE: 15 BRPM | HEART RATE: 67 BPM

## 2017-10-10 DIAGNOSIS — H10.9 CONJUNCTIVITIS OF RIGHT EYE, UNSPECIFIED CONJUNCTIVITIS TYPE: Primary | ICD-10-CM

## 2017-10-10 PROCEDURE — 99283 EMERGENCY DEPT VISIT LOW MDM: CPT

## 2017-10-10 RX ORDER — GENTAMICIN SULFATE 3 MG/ML
2 SOLUTION/ DROPS OPHTHALMIC 4 TIMES DAILY
Qty: 5 ML | Refills: 0 | Status: SHIPPED | OUTPATIENT
Start: 2017-10-10 | End: 2019-03-09

## 2017-10-10 NOTE — ED PROVIDER NOTES
Encounter Date: 10/10/2017       History     Chief Complaint   Patient presents with    Eye Problem     woke up with right eye crusty with discharge, tenderness, and swelling     Patient is a 27-year-old female who complains of right eye redness.  She awoke this way this morning with crusting on her eyelashes.  No known trauma.  She does not wear contact lens.  No visual changes.  She also has mild swelling of her eyelids.      The history is provided by the patient.     Review of patient's allergies indicates:  No Known Allergies  Past Medical History:   Diagnosis Date    Depression     Genital herpes      History reviewed. No pertinent surgical history.  Family History   Problem Relation Age of Onset    Diabetes Maternal Grandmother      Social History   Substance Use Topics    Smoking status: Never Smoker    Smokeless tobacco: Never Used    Alcohol use Yes      Comment: on special occassions     Review of Systems   HENT: Negative for congestion.    Eyes: Positive for pain, discharge and redness. Negative for visual disturbance.   Gastrointestinal: Negative for nausea and vomiting.   Neurological: Negative for headaches.   All other systems reviewed and are negative.      Physical Exam     Initial Vitals [10/10/17 0843]   BP Pulse Resp Temp SpO2   123/66 67 15 98.4 °F (36.9 °C) 99 %      MAP       85         Physical Exam    Nursing note and vitals reviewed.  Constitutional: No distress.   HENT:   Head: Normocephalic and atraumatic.   Eyes: EOM are normal. Pupils are equal, round, and reactive to light.   Conjunctival injection on right.  There is scant crusting on the right eyelashes.  No foreign body seen in the right eye.   Neck: Normal range of motion. Neck supple.   Cardiovascular: Normal rate, regular rhythm and normal heart sounds.   Pulmonary/Chest: Breath sounds normal.   Neurological: She is alert.   Skin: Skin is warm and dry.         ED Course   Procedures  Labs Reviewed - No data to display           Medical Decision Making:   ED Management:  27-year-old female with right-sided conjunctivitis.  She'll be placed on gentamicin drops.  She will follow-up with a primary physician if not resolved in 5 days.                   ED Course      Clinical Impression:   The encounter diagnosis was Conjunctivitis of right eye, unspecified conjunctivitis type.                           Jordan Umanzor MD  10/10/17 1037

## 2017-11-22 ENCOUNTER — HOSPITAL ENCOUNTER (EMERGENCY)
Facility: HOSPITAL | Age: 27
Discharge: HOME | End: 2017-11-22
Attending: EMERGENCY MEDICINE
Payer: MEDICAID

## 2017-11-22 VITALS
WEIGHT: 212 LBS | DIASTOLIC BLOOD PRESSURE: 64 MMHG | OXYGEN SATURATION: 98 % | SYSTOLIC BLOOD PRESSURE: 107 MMHG | TEMPERATURE: 99 F | HEIGHT: 69 IN | HEART RATE: 100 BPM | BODY MASS INDEX: 31.4 KG/M2 | RESPIRATION RATE: 16 BRPM

## 2017-11-22 DIAGNOSIS — J02.0 STREPTOCOCCAL PHARYNGITIS: Primary | ICD-10-CM

## 2017-11-22 DIAGNOSIS — R00.0 TACHYCARDIA: ICD-10-CM

## 2017-11-22 LAB
ALBUMIN SERPL BCP-MCNC: 3.5 G/DL
ALP SERPL-CCNC: 69 U/L
ALT SERPL W/O P-5'-P-CCNC: 24 U/L
ANION GAP SERPL CALC-SCNC: 10 MMOL/L
AST SERPL-CCNC: 18 U/L
B-HCG UR QL: NEGATIVE
BACTERIA #/AREA URNS HPF: ABNORMAL /HPF
BILIRUB SERPL-MCNC: 0.7 MG/DL
BILIRUB UR QL STRIP: ABNORMAL
BUN SERPL-MCNC: 6 MG/DL
CALCIUM SERPL-MCNC: 8.9 MG/DL
CHLORIDE SERPL-SCNC: 107 MMOL/L
CLARITY UR: CLEAR
CO2 SERPL-SCNC: 23 MMOL/L
COLOR UR: YELLOW
CREAT SERPL-MCNC: 0.8 MG/DL
CTP QC/QA: YES
DEPRECATED S PYO AG THROAT QL EIA: POSITIVE
ERYTHROCYTE [DISTWIDTH] IN BLOOD BY AUTOMATED COUNT: 12.4 %
EST. GFR  (AFRICAN AMERICAN): >60 ML/MIN/1.73 M^2
EST. GFR  (NON AFRICAN AMERICAN): >60 ML/MIN/1.73 M^2
FLUAV AG SPEC QL IA: NEGATIVE
FLUBV AG SPEC QL IA: NEGATIVE
GLUCOSE SERPL-MCNC: 96 MG/DL
GLUCOSE UR QL STRIP: NEGATIVE
HCT VFR BLD AUTO: 34 %
HGB BLD-MCNC: 11.5 G/DL
HGB UR QL STRIP: NEGATIVE
KETONES UR QL STRIP: ABNORMAL
LACTATE SERPL-SCNC: 0.5 MMOL/L
LEUKOCYTE ESTERASE UR QL STRIP: ABNORMAL
MCH RBC QN AUTO: 29.9 PG
MCHC RBC AUTO-ENTMCNC: 33.8 G/DL
MCV RBC AUTO: 89 FL
MICROSCOPIC COMMENT: ABNORMAL
NITRITE UR QL STRIP: NEGATIVE
PH UR STRIP: 8 [PH] (ref 5–8)
PLATELET # BLD AUTO: 181 K/UL
PMV BLD AUTO: 9.6 FL
POTASSIUM SERPL-SCNC: 3.7 MMOL/L
PROT SERPL-MCNC: 7.6 G/DL
PROT UR QL STRIP: ABNORMAL
RBC # BLD AUTO: 3.84 M/UL
RBC #/AREA URNS HPF: 2 /HPF (ref 0–4)
SODIUM SERPL-SCNC: 140 MMOL/L
SP GR UR STRIP: 1.02 (ref 1–1.03)
SPECIMEN SOURCE: NORMAL
SQUAMOUS #/AREA URNS HPF: 15 /HPF
URN SPEC COLLECT METH UR: ABNORMAL
UROBILINOGEN UR STRIP-ACNC: >=8 EU/DL
WBC # BLD AUTO: 14.13 K/UL
WBC #/AREA URNS HPF: 7 /HPF (ref 0–5)

## 2017-11-22 PROCEDURE — 25000003 PHARM REV CODE 250: Performed by: EMERGENCY MEDICINE

## 2017-11-22 PROCEDURE — 81025 URINE PREGNANCY TEST: CPT | Performed by: EMERGENCY MEDICINE

## 2017-11-22 PROCEDURE — 96361 HYDRATE IV INFUSION ADD-ON: CPT

## 2017-11-22 PROCEDURE — 80053 COMPREHEN METABOLIC PANEL: CPT

## 2017-11-22 PROCEDURE — 81000 URINALYSIS NONAUTO W/SCOPE: CPT

## 2017-11-22 PROCEDURE — 96360 HYDRATION IV INFUSION INIT: CPT

## 2017-11-22 PROCEDURE — 85027 COMPLETE CBC AUTOMATED: CPT

## 2017-11-22 PROCEDURE — 87880 STREP A ASSAY W/OPTIC: CPT

## 2017-11-22 PROCEDURE — 83605 ASSAY OF LACTIC ACID: CPT

## 2017-11-22 PROCEDURE — 93010 ELECTROCARDIOGRAM REPORT: CPT | Mod: ,,, | Performed by: INTERNAL MEDICINE

## 2017-11-22 PROCEDURE — 63600175 PHARM REV CODE 636 W HCPCS: Performed by: EMERGENCY MEDICINE

## 2017-11-22 PROCEDURE — 87400 INFLUENZA A/B EACH AG IA: CPT

## 2017-11-22 PROCEDURE — 99284 EMERGENCY DEPT VISIT MOD MDM: CPT | Mod: 25

## 2017-11-22 PROCEDURE — 93005 ELECTROCARDIOGRAM TRACING: CPT

## 2017-11-22 RX ORDER — IBUPROFEN 600 MG/1
600 TABLET ORAL
Status: COMPLETED | OUTPATIENT
Start: 2017-11-22 | End: 2017-11-22

## 2017-11-22 RX ORDER — DEXAMETHASONE SODIUM PHOSPHATE 100 MG/10ML
10 INJECTION INTRAMUSCULAR; INTRAVENOUS ONCE
Status: DISCONTINUED | OUTPATIENT
Start: 2017-11-22 | End: 2017-11-22

## 2017-11-22 RX ORDER — SODIUM CHLORIDE 9 MG/ML
1000 INJECTION, SOLUTION INTRAVENOUS
Status: COMPLETED | OUTPATIENT
Start: 2017-11-22 | End: 2017-11-22

## 2017-11-22 RX ORDER — AMOXICILLIN 500 MG/1
1000 CAPSULE ORAL DAILY
Qty: 18 CAPSULE | Refills: 0 | Status: SHIPPED | OUTPATIENT
Start: 2017-11-22 | End: 2017-12-01

## 2017-11-22 RX ORDER — AMOXICILLIN 250 MG/1
1000 CAPSULE ORAL
Status: COMPLETED | OUTPATIENT
Start: 2017-11-22 | End: 2017-11-22

## 2017-11-22 RX ADMIN — DEXAMETHASONE 10 MG: 4 TABLET ORAL at 01:11

## 2017-11-22 RX ADMIN — SODIUM CHLORIDE 1000 ML: 0.9 INJECTION, SOLUTION INTRAVENOUS at 12:11

## 2017-11-22 RX ADMIN — IBUPROFEN 600 MG: 600 TABLET, FILM COATED ORAL at 12:11

## 2017-11-22 RX ADMIN — AMOXICILLIN 1000 MG: 250 CAPSULE ORAL at 12:11

## 2017-11-22 RX ADMIN — SODIUM CHLORIDE 1000 ML: 0.9 INJECTION, SOLUTION INTRAVENOUS at 11:11

## 2017-11-22 NOTE — ED PROVIDER NOTES
Encounter Date: 11/22/2017       History     Chief Complaint   Patient presents with    Sore Throat     reports sore throat, fever, and chills since last night. Pt reports painful swallowing. States last dose of tylenol was 2200 last night.     Diarrhea     X 2 episodes this morning     Leydi Ferrell is a 27 y.o. female who  has a past medical history of Depression and Genital herpes.    The patient presents to the ED due to sore throat.  Patient reports symptoms started yesterday.  She reports she recently left the hospital, because her child recently had brain surgery at [facility].  She states last night she began having a sore throat, fever to 100.3 F, as well as chills and body aches.  She took Tylenol last night with some improvement.  She reports pregnant this morning with worsening sore throat and pain with swallowing.  She denies any associated vomiting, abdominal pain, flank pain, or urinary complaints.  She denies any chest pain or shortness of breath.  She has not taken anything for her symptoms today.            Review of patient's allergies indicates:  No Known Allergies  Past Medical History:   Diagnosis Date    Depression     Genital herpes      History reviewed. No pertinent surgical history.  Family History   Problem Relation Age of Onset    Diabetes Maternal Grandmother      Social History   Substance Use Topics    Smoking status: Never Smoker    Smokeless tobacco: Never Used    Alcohol use No     Review of Systems   Constitutional: Positive for fever. Negative for chills.   HENT: Positive for sinus pressure and sore throat. Negative for congestion and drooling.    Respiratory: Negative for cough and shortness of breath.    Cardiovascular: Negative for chest pain.   Gastrointestinal: Negative for abdominal pain, constipation, diarrhea, nausea and vomiting.   Genitourinary: Negative for dysuria, frequency and urgency.   Musculoskeletal: Positive for myalgias. Negative for back  pain.   Skin: Negative for rash and wound.   Neurological: Negative for syncope and weakness.   Hematological: Does not bruise/bleed easily.   Psychiatric/Behavioral: Negative for agitation, behavioral problems and confusion.       Physical Exam     Initial Vitals [11/22/17 1010]   BP Pulse Resp Temp SpO2   137/65 (!) 140 15 98.6 °F (37 °C) 98 %      MAP       89         Physical Exam    Nursing note and vitals reviewed.  Constitutional: She appears well-developed and well-nourished. She is not diaphoretic. No distress.   HENT:   Head: Normocephalic and atraumatic.   Right Ear: No foreign bodies.   Left Ear: Tympanic membrane normal. No foreign bodies.  No middle ear effusion.   Mouth/Throat: Uvula is midline and mucous membranes are normal. No oral lesions. No trismus in the jaw. No uvula swelling. Oropharyngeal exudate (R tonsil) and posterior oropharyngeal edema present. No posterior oropharyngeal erythema or tonsillar abscesses.   R ear with cerumen obscuring TM   Eyes: EOM are normal. Pupils are equal, round, and reactive to light.   Neck: No tracheal deviation present.   Cardiovascular: Normal rate, regular rhythm, normal heart sounds and intact distal pulses.   Pulmonary/Chest: Breath sounds normal. No stridor. No respiratory distress. She has no wheezes.   Abdominal: Soft. Bowel sounds are normal. She exhibits no distension and no mass. There is no tenderness.   Musculoskeletal: Normal range of motion. She exhibits no edema.   Neurological: She is alert and oriented to person, place, and time. She has normal strength. No cranial nerve deficit or sensory deficit.   Skin: Skin is warm and dry. Capillary refill takes less than 2 seconds. No pallor.   Psychiatric: She has a normal mood and affect. Her behavior is normal. Thought content normal.         ED Course   Procedures  Labs Reviewed   THROAT SCREEN, RAPID - Abnormal; Notable for the following:        Result Value    Rapid Strep A Screen Positive (*)      All other components within normal limits   CBC WITHOUT DIFFERENTIAL - Abnormal; Notable for the following:     WBC 14.13 (*)     RBC 3.84 (*)     Hemoglobin 11.5 (*)     Hematocrit 34.0 (*)     All other components within normal limits   URINALYSIS - Abnormal; Notable for the following:     Protein, UA Trace (*)     Ketones, UA 1+ (*)     Bilirubin (UA) 1+ (*)     Urobilinogen, UA >=8.0 (*)     Leukocytes, UA Trace (*)     All other components within normal limits   URINALYSIS MICROSCOPIC - Abnormal; Notable for the following:     WBC, UA 7 (*)     Bacteria, UA Few (*)     All other components within normal limits   COMPREHENSIVE METABOLIC PANEL   INFLUENZA A AND B ANTIGEN   LACTIC ACID, PLASMA   POCT URINE PREGNANCY     EKG Readings: (Independently Interpreted)   Initial Reading: No STEMI. Previous EKG: Compared with most recent EKG Rhythm: Sinus Tachycardia.   Sinus tachycardia, rate 126, T-wave inversion in lead III, no reciprocal changes or other signs of ischemia  When compared to prior EKG 02/2014, no significant change          Medical Decision Making:   Initial Assessment:   27-year-old female with fever, sore throat starting last night.  Took Tylenol last night with improvement in symptoms, but persistent sore throat.  Afebrile on arrival, mild tachycardia.  Exam with bilateral tonsillar edema and right-sided exudates, no apparent peritonsillar abscess or focal swelling/fluctuance.  Due to tachycardia, will obtain EKG, provide IV fluids, and obtain labs including flu swab and rapid strep.  Differential Diagnosis:   Differential Diagnosis includes, but is not limited to:  Abelardo's angina, epiglottitis, foreign body aspiration, retropharyngeal abscess, peritonsillar abscess, esophageal perforation, mediastinitis, esophagitis, sialolithiasis, parotitis, laryngitis, tracheitis, dental/periapical abscess, TMJ disorder, pneumonia, Streptococcal/bacterial pharyngitis, viral pharyngitis.    Clinical Tests:   Lab  Tests: Ordered and Reviewed  Medical Tests: Ordered and Reviewed  ED Management:  EKG sinus tach, stable from prior.  Labs with mild leukocytosis to 14k.  Strep +. Given PO Dex and ibuprofen.  After 1 L IVF, HR remained elevated to 120s.  2nd L IVF given, repeat HR 110s.  Due to persistent tachycardia, will add-on lactate, returned WNL.  I feel patient's symptoms and presentation most consistent with Strep pharyngitis and dehydration. I do not feel the patient has sepsis or additional underlying infectious process at this time. She is well-appearing and stable for D/C currently.  Will RX Amoxil for strep. F/u PCP in 3-5 days for re-check and further management.   Patient given strict return precautions including worsening symptoms, persistent fever, difficulty breathing or eating, N/V, or any other concerns.  Upon re-evaluation, the patient's status has imporved.    After complete ED evaluation, clinical impression is most consistent with strep pharyngitis.    At this time, I feel there is no emergent condition requiring further evaluation or admission. I believe the patient is stable for discharge from the ED. The patient and any additional family present were updated with test results, overall clinical impression, and recommended further plan of care. All questions were answered. The patient expressed understanding and agreed with current plan for discharge with PCP follow-up within 1 week. Strict return precautions were provided, including return/worsening of current symptoms or any other concerns.                      ED Course      Clinical Impression:   The primary encounter diagnosis was Streptococcal pharyngitis. A diagnosis of Tachycardia was also pertinent to this visit.                           Heriberto Lynne MD  11/22/17 0828

## 2017-11-22 NOTE — ED NOTES
Pt awake alert, pt reports sore throat with chills since last night, pt reports diarrhea X 2 this morning, denies chest pain or sob, co sore throat, denies n/v, sister in room with pt

## 2018-06-05 ENCOUNTER — HOSPITAL ENCOUNTER (EMERGENCY)
Facility: HOSPITAL | Age: 28
Discharge: HOME | End: 2018-06-05
Attending: EMERGENCY MEDICINE
Payer: MEDICAID

## 2018-06-05 VITALS
OXYGEN SATURATION: 100 % | TEMPERATURE: 98 F | DIASTOLIC BLOOD PRESSURE: 78 MMHG | RESPIRATION RATE: 20 BRPM | BODY MASS INDEX: 32.99 KG/M2 | SYSTOLIC BLOOD PRESSURE: 124 MMHG | HEART RATE: 97 BPM | WEIGHT: 198 LBS | HEIGHT: 65 IN

## 2018-06-05 DIAGNOSIS — S61.214A LACERATION OF RIGHT RING FINGER WITHOUT FOREIGN BODY WITHOUT DAMAGE TO NAIL, INITIAL ENCOUNTER: Primary | ICD-10-CM

## 2018-06-05 PROCEDURE — 12001 RPR S/N/AX/GEN/TRNK 2.5CM/<: CPT

## 2018-06-05 PROCEDURE — 99283 EMERGENCY DEPT VISIT LOW MDM: CPT | Mod: 25

## 2018-06-05 PROCEDURE — 63600175 PHARM REV CODE 636 W HCPCS: Performed by: EMERGENCY MEDICINE

## 2018-06-05 PROCEDURE — 90471 IMMUNIZATION ADMIN: CPT | Performed by: EMERGENCY MEDICINE

## 2018-06-05 PROCEDURE — 12041 INTMD RPR N-HF/GENIT 2.5CM/<: CPT

## 2018-06-05 PROCEDURE — 90715 TDAP VACCINE 7 YRS/> IM: CPT | Performed by: EMERGENCY MEDICINE

## 2018-06-05 RX ADMIN — CLOSTRIDIUM TETANI TOXOID ANTIGEN (FORMALDEHYDE INACTIVATED), CORYNEBACTERIUM DIPHTHERIAE TOXOID ANTIGEN (FORMALDEHYDE INACTIVATED), BORDETELLA PERTUSSIS TOXOID ANTIGEN (GLUTARALDEHYDE INACTIVATED), BORDETELLA PERTUSSIS FILAMENTOUS HEMAGGLUTININ ANTIGEN (FORMALDEHYDE INACTIVATED), BORDETELLA PERTUSSIS PERTACTIN ANTIGEN, AND BORDETELLA PERTUSSIS FIMBRIAE 2/3 ANTIGEN 0.5 ML: 5; 2; 2.5; 5; 3; 5 INJECTION, SUSPENSION INTRAMUSCULAR at 09:06

## 2018-06-06 NOTE — ED PROVIDER NOTES
Encounter Date: 6/5/2018    SCRIBE #1 NOTE: I, Franky Arceo, am scribing for, and in the presence of, Dr. Amador Lambert.       History     Chief Complaint   Patient presents with    Laceration     28y F ambulatory to ED with small laceration tp right 5th finger from kitchen knife. bleeidng controlled in triage.     27 y/o F presents to the ED due to laceration to the patient's right 5th digit just prior to arrival. Patient reports that she was cutting vegetables when she was distracted by her children and ended up cutting her right pinky finger. Patient reports that she was able to control the bleeding herself and that she does not remember when her last tetanus shot was. She reports a throbbing pain to the area worse with movement and without alleviating factors. No other injury. Unsure of last tetanus.       The history is provided by the patient. No  was used.     Review of patient's allergies indicates:  No Known Allergies  Past Medical History:   Diagnosis Date    Depression     Genital herpes      History reviewed. No pertinent surgical history.  Family History   Problem Relation Age of Onset    Diabetes Maternal Grandmother      Social History   Substance Use Topics    Smoking status: Never Smoker    Smokeless tobacco: Never Used    Alcohol use Yes      Comment: occasionally     Review of Systems   Constitutional: Negative for chills, fatigue and fever.   HENT: Negative for congestion, rhinorrhea, sore throat and voice change.    Eyes: Negative for photophobia, redness and visual disturbance.   Respiratory: Negative for cough, choking and shortness of breath.    Cardiovascular: Negative for chest pain, palpitations and leg swelling.   Gastrointestinal: Negative for abdominal pain, diarrhea, nausea and vomiting.   Genitourinary: Negative for dysuria, frequency and urgency.   Musculoskeletal: Negative for back pain, myalgias and neck pain.   Neurological: Negative for dizziness,  seizures, speech difficulty, weakness, light-headedness, numbness and headaches.   All other systems reviewed and are negative.      Physical Exam     Initial Vitals [06/05/18 2020]   BP Pulse Resp Temp SpO2   121/88 71 18 98.5 °F (36.9 °C) 100 %      MAP       99         Physical Exam    Nursing note and vitals reviewed.  Constitutional: She appears well-developed and well-nourished. She is not diaphoretic. No distress.   HENT:   Head: Normocephalic and atraumatic.   Mouth/Throat: Oropharynx is clear and moist.   Eyes: Conjunctivae and EOM are normal. Pupils are equal, round, and reactive to light.   Neck: Normal range of motion. Neck supple. No tracheal deviation present.   Cardiovascular: Normal rate, regular rhythm, normal heart sounds and intact distal pulses.   Pulmonary/Chest: Breath sounds normal. No respiratory distress. She has no wheezes. She has no rhonchi. She has no rales.   Abdominal: Soft. Bowel sounds are normal. She exhibits no distension. There is no tenderness.   Musculoskeletal: Normal range of motion. She exhibits tenderness.        Hands:  Full ROM to the pinky, no evidence of tendon disruption   Neurological: She is alert and oriented to person, place, and time.   Skin: Skin is warm and dry. Capillary refill takes less than 2 seconds. Laceration noted.        Laceration to the anterior medial aspect of the right 5th digit, just proximal to the PIP joint; CR < 2 seconds to all 10 digits         ED Course   Lac Repair  Date/Time: 6/5/2018 9:55 PM  Performed by: OCTAVIANO WALTON  Authorized by: OCTAVIANO WALTON   Consent Done: Yes  Consent: Verbal consent obtained.  Body area: upper extremity  Location details: right small finger  Laceration length: 0.5 cm  Foreign bodies: no foreign bodies  Tendon involvement: none  Nerve involvement: none  Vascular damage: no  Patient sedated: no  Irrigation solution: tap water  Irrigation method: tap  Amount of cleaning: extensive  Debridement:  none  Degree of undermining: none  Skin closure: glue  Technique: simple  Approximation: close  Approximation difficulty: simple  Dressing: Steri-Strips  Patient tolerance: Patient tolerated the procedure well with no immediate complications        Labs Reviewed - No data to display          Medical Decision Making:   Initial Assessment:   The patient is a 28 y.o. female who presents to the ED due to a medial inferior medial laceration to the patient's right 5th digits just prior to arrival.  Differential Diagnosis:   FB, tendon disruption,   ED Management:  Px placed in splint to ensure good healing of laceration, tolerated procedure well. Instructed on home mgmt, reasons to return to the ED, f/u with PCP.                       Clinical Impression:   The encounter diagnosis was Laceration of right ring finger without foreign body without damage to nail, initial encounter.    I, Dr. Amador Lambert, personally performed the services described in this documentation. All medical record entries made by the scribe were at my direction and in my presence.  I have reviewed the chart and agree that the record reflects my personal performance and is accurate and complete. Amador Lambert MD.  9:57 PM 06/05/2018    * SCRIBE ATTESTATION    Disposition:   Disposition: Discharged  Condition: Stable                        Amador Lambert MD  06/05/18 1753

## 2018-06-06 NOTE — ED NOTES
One cm laceration noted to lower right fifth finger , cleaned with tepid water , closed with derma mccullough per dr Lambert , and steri strips applied , finger splint applied and secured in place with cob ain. Pt tolerated well

## 2019-03-09 ENCOUNTER — HOSPITAL ENCOUNTER (EMERGENCY)
Facility: HOSPITAL | Age: 29
Discharge: HOME | End: 2019-03-09
Attending: EMERGENCY MEDICINE
Payer: MEDICAID

## 2019-03-09 VITALS
WEIGHT: 198 LBS | TEMPERATURE: 100 F | RESPIRATION RATE: 20 BRPM | SYSTOLIC BLOOD PRESSURE: 139 MMHG | DIASTOLIC BLOOD PRESSURE: 73 MMHG | HEIGHT: 65 IN | BODY MASS INDEX: 32.99 KG/M2 | OXYGEN SATURATION: 99 % | HEART RATE: 100 BPM

## 2019-03-09 DIAGNOSIS — J10.1 INFLUENZA A: Primary | ICD-10-CM

## 2019-03-09 LAB
B-HCG UR QL: NEGATIVE
CTP QC/QA: YES
INFLUENZA A, MOLECULAR: POSITIVE
INFLUENZA B, MOLECULAR: NEGATIVE
SPECIMEN SOURCE: ABNORMAL

## 2019-03-09 PROCEDURE — 87502 INFLUENZA DNA AMP PROBE: CPT

## 2019-03-09 PROCEDURE — 25000003 PHARM REV CODE 250: Performed by: NURSE PRACTITIONER

## 2019-03-09 PROCEDURE — 99284 EMERGENCY DEPT VISIT MOD MDM: CPT

## 2019-03-09 PROCEDURE — 81025 URINE PREGNANCY TEST: CPT | Performed by: NURSE PRACTITIONER

## 2019-03-09 RX ORDER — BENZONATATE 100 MG/1
100 CAPSULE ORAL 3 TIMES DAILY PRN
Qty: 20 CAPSULE | Refills: 0 | Status: SHIPPED | OUTPATIENT
Start: 2019-03-09 | End: 2019-03-19

## 2019-03-09 RX ORDER — OSELTAMIVIR PHOSPHATE 75 MG/1
75 CAPSULE ORAL 2 TIMES DAILY
Qty: 10 CAPSULE | Refills: 0 | Status: SHIPPED | OUTPATIENT
Start: 2019-03-09 | End: 2019-03-14

## 2019-03-09 RX ORDER — IBUPROFEN 600 MG/1
600 TABLET ORAL
Status: COMPLETED | OUTPATIENT
Start: 2019-03-09 | End: 2019-03-09

## 2019-03-09 RX ADMIN — IBUPROFEN 600 MG: 600 TABLET, FILM COATED ORAL at 09:03

## 2019-03-09 NOTE — DISCHARGE INSTRUCTIONS
Drink plenty of water and fluids.  Take 2 Tylenol every 4 hour and ibuprofen 600 mg every 6 hours for fever and body aches.  You can use decongestants like Sudafed or Mucinex from the pharmacy, Robitussin can be use for cough.

## 2019-03-09 NOTE — ED PROVIDER NOTES
Encounter Date: 3/9/2019       History     Chief Complaint   Patient presents with    Fever     c/o cough x2 days and fever, chills, body aches, and sore throat since this morning.     30 y/o female with PMH of depression presents for cough x2 days and congestion, body aches and fever that started today.  Pt has temp of 102° upon admission, took some children's liquid Motrin yesterday, but has not taken any medications for her symptoms today.  Denies N/V/D, neck pain or stiffness.      The history is provided by the patient.     Review of patient's allergies indicates:  No Known Allergies  Past Medical History:   Diagnosis Date    Depression     Genital herpes      History reviewed. No pertinent surgical history.  Family History   Problem Relation Age of Onset    Diabetes Maternal Grandmother      Social History     Tobacco Use    Smoking status: Never Smoker    Smokeless tobacco: Never Used   Substance Use Topics    Alcohol use: Yes     Comment: occasionally    Drug use: No     Review of Systems   Constitutional: Positive for appetite change, chills and fever.   HENT: Positive for congestion and sore throat. Negative for ear pain, rhinorrhea, trouble swallowing and voice change.    Respiratory: Positive for cough. Negative for shortness of breath and wheezing.    Gastrointestinal: Negative for abdominal pain, diarrhea, nausea and vomiting.   Musculoskeletal: Positive for myalgias. Negative for gait problem, neck pain and neck stiffness.   Allergic/Immunologic: Negative for immunocompromised state.   Neurological: Negative for headaches.   Hematological: Does not bruise/bleed easily.   All other systems reviewed and are negative.      Physical Exam     Initial Vitals [03/09/19 0943]   BP Pulse Resp Temp SpO2   139/73 (!) 111 20 (!) 102 °F (38.9 °C) 97 %      MAP       --         Physical Exam    Nursing note and vitals reviewed.  Constitutional: Vital signs are normal. She appears well-developed and  well-nourished. She is cooperative.  Non-toxic appearance. She does not appear ill. No distress.   HENT:   Head: Atraumatic.   Right Ear: Tympanic membrane and ear canal normal.   Left Ear: Tympanic membrane and ear canal normal.   Nose: Mucosal edema and rhinorrhea present.   Mouth/Throat: Mucous membranes are normal. Posterior oropharyngeal erythema present. No oropharyngeal exudate or posterior oropharyngeal edema.   Eyes: Conjunctivae and EOM are normal.   Neck: Neck supple. No neck rigidity.   Cardiovascular: Normal rate, regular rhythm and normal heart sounds.   Pulses:       Dorsalis pedis pulses are 2+ on the right side, and 2+ on the left side.   Pulmonary/Chest: Effort normal and breath sounds normal.   Abdominal: Soft. Normal appearance. There is no tenderness.   Musculoskeletal: Normal range of motion.   Lymphadenopathy:     She has no cervical adenopathy.   Neurological: She is alert and oriented to person, place, and time. She has normal strength. No cranial nerve deficit or sensory deficit.   Skin: Skin is warm and intact. Capillary refill takes less than 2 seconds.   Psychiatric: She has a normal mood and affect. Her speech is normal and behavior is normal.         ED Course   Procedures  Labs Reviewed   INFLUENZA A & B BY MOLECULAR - Abnormal; Notable for the following components:       Result Value    Influenza A, Molecular Positive (*)     All other components within normal limits   POCT URINE PREGNANCY          Imaging Results    None          Medical Decision Making:   Initial Assessment:   Pt presents for cough x2 days and congestion, body aches and fever that started today.    UR congestion present, lungs clear no wheezing  Differential Diagnosis:   Influenza, viral URI with cough, strep pharyngitis  Clinical Tests:   Lab Tests: Ordered and Reviewed  ED Management:  Influenza-positive A  Tamiflu Rx given with side effects discussed. Symptomatic control with OTC medications for cough and cold.   Discussed use of Tylenol and ibuprofen for fever and body aches.  Pt to return to ER for any concerns, a worsening or change in current condition. Pt to f/u with PCP Monday. Pt verbalized understanding of all d/c instructions.     Other:   I have discussed this case with another health care provider.              Attending Attestation:     Physician Attestation Statement for NP/PA:   I reviewed the chart but I did not personally examine the patient. The face to face encounter was performed by the NP/PA.                  ED Course as of Mar 09 1038   Sat Mar 09, 2019   1021 Influenza A, Molecular: (!) Positive [LD]      ED Course User Index  [LD] Ally Hahn MD     Clinical Impression:       ICD-10-CM ICD-9-CM   1. Influenza A J10.1 487.1                                Greg Angeles NP  03/09/19 1040       Ally Hahn MD  03/09/19 1122

## 2019-03-09 NOTE — ED TRIAGE NOTES
Pt presents to ED with C/O chills, body aches, cough with onset x days. Pt denies taking any medication for the pain. Pt states pain is 9/10 at this time.

## 2020-04-23 NOTE — ED NOTES
"Patient identifiers verified and correct for Leydi Ferrell.    LOC: The patient is awake, alert and oriented x 4. speaking appropriately, no slurred speech  APPEARANCE: Patient resting comfortably and in no acute distress. clean and well groomed. No JVD visible. Stated, "pain level is a 0."  SKIN: skin is warm dry and intact. color consistent with ethnicity. No tenting observed. capillary refill <3 seconds. no clubbing noted to nail beds. no breakdown or brusing visible. Mucus membranes moist acyanotic  MUSKULOSKELETAL: Full range of motion present in all extremities. Hand  and leg strength strong +2 bilaterally  RESPIRATORY: Airway is open and patent. Respirations-unlabored, spontaneous, equal bilaterally on inspiration and expiration. normal rate and rhythm. No accessory muscle use noted. Lungs clear to auscultation in all fields bilaterally anterior and posterior.   CARDIAC: Patient has even and regular rhythm. no bruits auscultated or thrills palpated. no peripheral edema noted. No chest pain.  ABDOMEN: Soft and non tender to palpation. no distention noted. Normoactive bowel sounds X4 quadrants. No complaints of abnormal bowel movements. Normal appetite.   NEUROLOGIC: Eyes open spontaneously. behavior appropriate to situation. follows commands. facial expression symmetrical. purposeful motor response noted. sensation present in all extremities when touched with a finger. No s/s of ischemic stroke. PERRLA  : No complaints of frequency, burning, urgency or blood in the urine.           " detailed exam

## 2022-08-27 ENCOUNTER — HOSPITAL ENCOUNTER (EMERGENCY)
Dept: HOSPITAL 5 - ED | Age: 32
LOS: 1 days | Discharge: HOME | End: 2022-08-28
Payer: SELF-PAY

## 2022-08-27 DIAGNOSIS — F17.200: ICD-10-CM

## 2022-08-27 DIAGNOSIS — F12.90: ICD-10-CM

## 2022-08-27 DIAGNOSIS — J02.0: Primary | ICD-10-CM

## 2022-08-27 PROCEDURE — 99283 EMERGENCY DEPT VISIT LOW MDM: CPT

## 2022-08-27 PROCEDURE — 87430 STREP A AG IA: CPT

## 2022-08-28 VITALS — SYSTOLIC BLOOD PRESSURE: 126 MMHG | DIASTOLIC BLOOD PRESSURE: 70 MMHG

## 2022-08-28 NOTE — EMERGENCY DEPARTMENT REPORT
- General


Chief Complaint: Upper Respiratory Infection


Stated Complaint: BOADYACHE/SORE THROAT/HEADACHE


Time Seen by Provider: 22 04:09


Source: patient


Mode of arrival: Ambulatory


Limitations: No Limitations





- History of Present Illness


Initial Comments: 





Patient is a 32-year-old female who presents with head congestion, sore throat, 

fever with a T-max of 100.0, myalgias, cough, rhinorrhea, headache, anorexia, 

fatigue, diarrhea with 1 episode of vomiting.  Onset 2 days ago.  Denies anosmia

or ageusia.  No chest pain or shortness of breath.  She does work at a 

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 

states she was not in the rooms of any of the patient's who tested positive.  

She is not vaccinated.  Her main concern today is sore throat and she is 

concerned that she has strep throat.  She has been taking NyQuil and TheraFlu 

without relief.


MD Complaint: fever, cough, sore throat, rhinorrhea, nasal congestion


Onset/Timin


-: days(s)


Severity: moderate


Consistency: constant


Improves With: nothing


Worsens With: nothing


Context: sick contacts (As above)


Associated Symptoms: denies: stiff neck, chest pain, shortness of breath, 

dysuria, rash, confusion, weight loss, ear pain


Treatments Prior to Arrival: Acetaminophen, "cold medicine"





- Related Data


                                  Previous Rx's











 Medication  Instructions  Recorded  Last Taken  Type


 


Amoxicillin/K Clav Tab [Augmentin 1 tab PO Q12HR 10 Days #20 tab 22 

Unknown Rx





875 mg]    


 


Ibuprofen [Motrin 600 MG tab] 600 mg PO Q8H PRN #20 tablet 22 Unknown Rx











                                    Allergies











Allergy/AdvReac Type Severity Reaction Status Date / Time


 


No Known Allergies Allergy   Unverified 22 22:39














ED Review of Systems


ROS: 


Stated complaint: BOADYACHE/SORE THROAT/HEADACHE


Other details as noted in HPI





Comment: All other systems reviewed and negative


Constitutional: see HPI


Eyes: denies: eye discharge, vision change


ENT: as per HPI.  denies: ear pain


Respiratory: see HPI.  denies: shortness of breath


Cardiovascular: denies: chest pain, palpitations, edema


Endocrine: denies: intolerance to cold, intolerance to heat


Gastrointestinal: nausea, vomiting, diarrhea.  denies: abdominal pain


Genitourinary: denies: urgency, dysuria, frequency, hematuria


Musculoskeletal: myalgia.  denies: back pain, joint swelling


Skin: denies: rash, change in color


Neurological: headache.  denies: weakness, numbness, paresthesias, confusion, 

abnormal gait


Psychiatric: denies: anxiety, depression


Hematological/Lymphatic: denies: easy bleeding, easy bruising





ED Past Medical Hx





- Past Medical History


Previous Medical History?: No





- Surgical History


Past Surgical History?: No





- Family History


Family history: no significant





- Social History


Smoking Status: Current Every Day Smoker


Substance Use Type: Marijuana





- Medications


Home Medications: 


                                Home Medications











 Medication  Instructions  Recorded  Confirmed  Last Taken  Type


 


Amoxicillin/K Clav Tab [Augmentin 1 tab PO Q12HR 10 Days #20 tab 22  

Unknown Rx





875 mg]     


 


Ibuprofen [Motrin 600 MG tab] 600 mg PO Q8H PRN #20 tablet 22  Unknown Rx














ED Physical Exam





- General


Limitations: No Limitations


General appearance: alert, in no apparent distress





- Head


Head exam: Present: atraumatic, normocephalic





- Eye


Eye exam: Present: normal appearance, PERRL, scleral icterus, conjunctival 

injection





- ENT


ENT exam: Present: mucous membranes moist, TM's normal bilaterally





- Expanded ENT Exam


  ** Expanded


Mouth exam: Absent: drooling, trismus, muffled voice


Throat exam: Positive: tonsillar erythema (Mild), tonsillomegaly (Mild).  

Negative: tonsillar exudate, R peritonsillar mass, L peritonsillar mass





- Neck


Neck exam: Present: normal inspection, full ROM.  Absent: tenderness, 

meningismus





- Respiratory


Respiratory exam: Present: normal lung sounds bilaterally.  Absent: respiratory 

distress, wheezes, rales, rhonchi, stridor





- Cardiovascular


Cardiovascular Exam: Present: regular rate, normal rhythm, normal heart sounds





- GI/Abdominal


GI/Abdominal exam: Present: soft.  Absent: distended





- Neurological Exam


Neurological exam: Present: alert, oriented X3, CN II-XII intact





- Psychiatric


Psychiatric exam: Present: normal affect, normal mood





- Skin


Skin exam: Present: warm, dry, intact, normal color





ED Course


                                   Vital Signs











  22





  22:39 06:00


 


Temperature 99.2 F 98.9 F


 


Pulse Rate 95 H 73


 


Respiratory 18 15





Rate  


 


Blood Pressure 129/69 


 


Blood Pressure  126/70





[Left]  


 


O2 Sat by Pulse 100 100





Oximetry  














- Reevaluation(s)


Reevaluation #1: 





22 04:17


Viral syndrome possible COVID.  Patient concerned about strep so strep test 

ordered.


Reevaluation #2: 





22 06:30


Strep positive





ED Medical Decision Making





- Medical Decision Making





Strep is positive.  Discussed with patient the possibility of having both strep 

and COVID.  She should still test for COVID when she gets home.


Critical care attestation.: 


If time is entered above; I have spent that time in minutes in the direct care 

of this critically ill patient, excluding procedure time.








ED Disposition


Clinical Impression: 


 Strep pharyngitis





Disposition: 01 HOME / SELF CARE / HOMELESS


Is pt being admited?: No


Condition: Stable


Instructions:  Strep Throat, Adult, Easy-to-Read


Additional Instructions: 


Warm salt water gargles.  Ibuprofen.  Amoxicillin.


Prescriptions: 


Amoxicillin/K Clav Tab [Augmentin 875 mg] 1 tab PO Q12HR 10 Days #20 tab


Ibuprofen [Motrin 600 MG tab] 600 mg PO Q8H PRN #20 tablet


 PRN Reason: Pain


Forms:  Work/School Release Form(ED)


Time of Disposition: 06:37